# Patient Record
Sex: FEMALE | Race: BLACK OR AFRICAN AMERICAN | NOT HISPANIC OR LATINO
[De-identification: names, ages, dates, MRNs, and addresses within clinical notes are randomized per-mention and may not be internally consistent; named-entity substitution may affect disease eponyms.]

---

## 2019-10-23 PROBLEM — Z00.00 ENCOUNTER FOR PREVENTIVE HEALTH EXAMINATION: Status: ACTIVE | Noted: 2019-10-23

## 2019-10-24 ENCOUNTER — APPOINTMENT (OUTPATIENT)
Dept: OBGYN | Facility: CLINIC | Age: 34
End: 2019-10-24
Payer: COMMERCIAL

## 2019-10-24 VITALS
HEIGHT: 63 IN | TEMPERATURE: 98.7 F | SYSTOLIC BLOOD PRESSURE: 122 MMHG | DIASTOLIC BLOOD PRESSURE: 74 MMHG | RESPIRATION RATE: 16 BRPM | WEIGHT: 167 LBS | BODY MASS INDEX: 29.59 KG/M2

## 2019-10-24 DIAGNOSIS — N91.1 SECONDARY AMENORRHEA: ICD-10-CM

## 2019-10-24 LAB
BILIRUB UR QL STRIP: NORMAL
BILIRUB UR QL STRIP: NORMAL
CLARITY UR: CLEAR
CLARITY UR: CLEAR
COLLECTION METHOD: NORMAL
COLLECTION METHOD: NORMAL
GLUCOSE UR-MCNC: NORMAL
GLUCOSE UR-MCNC: NORMAL
HCG UR QL: 0.2 EU/DL
HCG UR QL: 0.2 EU/DL
HCG UR QL: POSITIVE
HGB UR QL STRIP.AUTO: NORMAL
HGB UR QL STRIP.AUTO: NORMAL
KETONES UR-MCNC: NORMAL
KETONES UR-MCNC: NORMAL
LEUKOCYTE ESTERASE UR QL STRIP: NORMAL
LEUKOCYTE ESTERASE UR QL STRIP: NORMAL
NITRITE UR QL STRIP: NORMAL
NITRITE UR QL STRIP: NORMAL
PH UR STRIP: 6.5
PH UR STRIP: 6.5
PROT UR STRIP-MCNC: NORMAL
PROT UR STRIP-MCNC: NORMAL
QUALITY CONTROL: YES
SP GR UR STRIP: 1.02
SP GR UR STRIP: 1.02

## 2019-10-24 PROCEDURE — 81025 URINE PREGNANCY TEST: CPT

## 2019-10-24 PROCEDURE — 76830 TRANSVAGINAL US NON-OB: CPT

## 2019-10-24 PROCEDURE — 81003 URINALYSIS AUTO W/O SCOPE: CPT | Mod: QW

## 2019-10-24 PROCEDURE — 99204 OFFICE O/P NEW MOD 45 MIN: CPT | Mod: 25

## 2019-10-24 RX ORDER — VITAMIN A ACETATE, .BETA.-CAROTENE, ASCORBIC ACID, CHOLECALCIFEROL, .ALPHA.-TOCOPHEROL ACETATE, DL-, THIAMINE MONONITRATE, RIBOFLAVIN, NIACINAMIDE, PYRIDOXINE HYDROCHLORIDE, FOLIC ACID, CYANOCOBALAMIN, CALCIUM CARBONATE, FERROUS FUMARATE, ZINC OXIDE, AND CUPRIC OXIDE 2000; 2000; 120; 400; 22; 1.84; 3; 20; 10; 1; 12; 200; 27; 25; 2 [IU]/1; [IU]/1; MG/1; [IU]/1; MG/1; MG/1; MG/1; MG/1; MG/1; MG/1; UG/1; MG/1; MG/1; MG/1; MG/1
27-1 TABLET ORAL DAILY
Qty: 90 | Refills: 3 | Status: ACTIVE | COMMUNITY
Start: 2019-10-24 | End: 1900-01-01

## 2019-10-24 NOTE — PROCEDURE
[Intrauterine Pregnancy] : intrauterine pregnancy [Fetal Heart] : fetal heart present [Yolk Sac] : yolk sac present [CRL: ___ (mm)] : CRL - [unfilled]Umm [Date: ___] : EDC: [unfilled] [Current GA by Sonogram: ___ (wks)] : Current GA by Sonogram: [unfilled]Uwks [___ day(s)] : [unfilled] days [WNL] : Transvaginal OB Sonogram WNL [FreeTextEntry1] : SIUP. SONO CWD'S

## 2019-11-14 ENCOUNTER — APPOINTMENT (OUTPATIENT)
Dept: OBGYN | Facility: CLINIC | Age: 34
End: 2019-11-14

## 2019-11-21 ENCOUNTER — NON-APPOINTMENT (OUTPATIENT)
Age: 34
End: 2019-11-21

## 2019-11-21 ENCOUNTER — APPOINTMENT (OUTPATIENT)
Dept: OBGYN | Facility: CLINIC | Age: 34
End: 2019-11-21
Payer: COMMERCIAL

## 2019-11-21 VITALS
SYSTOLIC BLOOD PRESSURE: 110 MMHG | WEIGHT: 173 LBS | RESPIRATION RATE: 18 BRPM | OXYGEN SATURATION: 98 % | HEIGHT: 63 IN | BODY MASS INDEX: 30.65 KG/M2 | DIASTOLIC BLOOD PRESSURE: 80 MMHG

## 2019-11-21 DIAGNOSIS — I49.9 CARDIAC ARRHYTHMIA, UNSPECIFIED: ICD-10-CM

## 2019-11-21 DIAGNOSIS — Z32.00 ENCOUNTER FOR PREGNANCY TEST, RESULT UNKNOWN: ICD-10-CM

## 2019-11-21 PROCEDURE — 76815 OB US LIMITED FETUS(S): CPT

## 2019-11-21 PROCEDURE — 81003 URINALYSIS AUTO W/O SCOPE: CPT | Mod: QW

## 2019-11-21 PROCEDURE — 0501F PRENATAL FLOW SHEET: CPT

## 2019-11-22 LAB
ABO + RH PNL BLD: NORMAL
BASOPHILS # BLD AUTO: 0.02 K/UL
BASOPHILS NFR BLD AUTO: 0.3 %
BILIRUB UR QL STRIP: NORMAL
BLD GP AB SCN SERPL QL: NORMAL
C TRACH RRNA SPEC QL NAA+PROBE: NOT DETECTED
CLARITY UR: CLEAR
COLLECTION METHOD: NORMAL
EOSINOPHIL # BLD AUTO: 0.09 K/UL
EOSINOPHIL NFR BLD AUTO: 1.1 %
ESTIMATED AVERAGE GLUCOSE: 103 MG/DL
GLUCOSE UR-MCNC: NORMAL
HBA1C MFR BLD HPLC: 5.2 %
HBV SURFACE AG SER QL: NONREACTIVE
HCG UR QL: 0.2 EU/DL
HCT VFR BLD CALC: 40.1 %
HGB BLD-MCNC: 13 G/DL
HGB UR QL STRIP.AUTO: NORMAL
HIV1+2 AB SPEC QL IA.RAPID: NONREACTIVE
HPV HIGH+LOW RISK DNA PNL CVX: NOT DETECTED
IMM GRANULOCYTES NFR BLD AUTO: 0.3 %
KETONES UR-MCNC: NORMAL
LEUKOCYTE ESTERASE UR QL STRIP: NORMAL
LYMPHOCYTES # BLD AUTO: 1.96 K/UL
LYMPHOCYTES NFR BLD AUTO: 24.6 %
MAN DIFF?: NORMAL
MCHC RBC-ENTMCNC: 29.3 PG
MCHC RBC-ENTMCNC: 32.4 GM/DL
MCV RBC AUTO: 90.3 FL
MONOCYTES # BLD AUTO: 0.32 K/UL
MONOCYTES NFR BLD AUTO: 4 %
N GONORRHOEA RRNA SPEC QL NAA+PROBE: NOT DETECTED
NEUTROPHILS # BLD AUTO: 5.57 K/UL
NEUTROPHILS NFR BLD AUTO: 69.7 %
NITRITE UR QL STRIP: NORMAL
PH UR STRIP: 7
PLATELET # BLD AUTO: 197 K/UL
PROT UR STRIP-MCNC: NORMAL
RBC # BLD: 4.44 M/UL
RBC # FLD: 13.5 %
RUBV IGG FLD-ACNC: 3.7 INDEX
RUBV IGG SER-IMP: POSITIVE
SOURCE TP AMPLIFICATION: NORMAL
SP GR UR STRIP: 1.02
T PALLIDUM AB SER QL IA: NEGATIVE
TSH SERPL-ACNC: 0.33 UIU/ML
VZV AB TITR SER: POSITIVE
VZV IGG SER IF-ACNC: 1159 INDEX
WBC # FLD AUTO: 7.98 K/UL

## 2019-11-25 LAB
HGB A MFR BLD: 96.4 %
HGB A2 MFR BLD: 3.2 %
HGB F MFR BLD: 0.4 %
HGB FRACT BLD-IMP: NORMAL
RUBV IGM FLD-ACNC: <20 AU/ML

## 2019-11-26 ENCOUNTER — TRANSCRIPTION ENCOUNTER (OUTPATIENT)
Age: 34
End: 2019-11-26

## 2019-12-05 LAB
AR GENE MUT ANL BLD/T: NEGATIVE
B19V IGG SER QL IA: 1.1 INDEX
B19V IGG+IGM SER-IMP: ABNORMAL
B19V IGG+IGM SER-IMP: NORMAL
B19V IGM FLD-ACNC: 0.2 INDEX
B19V IGM SER-ACNC: NEGATIVE
CFTR MUT TESTED BLD/T: NEGATIVE
FMR1 GENE MUT ANL BLD/T: NORMAL

## 2019-12-09 ENCOUNTER — APPOINTMENT (OUTPATIENT)
Dept: OBGYN | Facility: CLINIC | Age: 34
End: 2019-12-09

## 2019-12-09 VITALS
DIASTOLIC BLOOD PRESSURE: 70 MMHG | HEIGHT: 63 IN | SYSTOLIC BLOOD PRESSURE: 128 MMHG | WEIGHT: 173.38 LBS | BODY MASS INDEX: 30.72 KG/M2

## 2019-12-09 DIAGNOSIS — Z3A.14 14 WEEKS GESTATION OF PREGNANCY: ICD-10-CM

## 2019-12-09 LAB
BILIRUB UR QL STRIP: NORMAL
CLARITY UR: CLEAR
COLLECTION METHOD: NORMAL
GLUCOSE UR-MCNC: NORMAL
HCG UR QL: 0.2 EU/DL
HGB UR QL STRIP.AUTO: NORMAL
KETONES UR-MCNC: NORMAL
LEUKOCYTE ESTERASE UR QL STRIP: NORMAL
NITRITE UR QL STRIP: NORMAL
PH UR STRIP: 7
PROT UR STRIP-MCNC: NORMAL
SP GR UR STRIP: 1.02

## 2020-01-06 ENCOUNTER — APPOINTMENT (OUTPATIENT)
Dept: OBGYN | Facility: CLINIC | Age: 35
End: 2020-01-06
Payer: COMMERCIAL

## 2020-01-06 VITALS
RESPIRATION RATE: 16 BRPM | BODY MASS INDEX: 30.86 KG/M2 | WEIGHT: 174.16 LBS | SYSTOLIC BLOOD PRESSURE: 110 MMHG | HEIGHT: 63 IN | DIASTOLIC BLOOD PRESSURE: 70 MMHG | HEART RATE: 72 BPM

## 2020-01-06 DIAGNOSIS — Z3A.18 18 WEEKS GESTATION OF PREGNANCY: ICD-10-CM

## 2020-01-06 DIAGNOSIS — Z34.92 ENCOUNTER FOR SUPERVISION OF NORMAL PREGNANCY, UNSPECIFIED, SECOND TRIMESTER: ICD-10-CM

## 2020-01-06 PROCEDURE — 0502F SUBSEQUENT PRENATAL CARE: CPT

## 2020-01-08 LAB
2ND TRIMESTER DATA: NORMAL
AFP PNL SERPL: NORMAL
AFP SERPL-ACNC: NORMAL
B-HCG FREE SERPL-MCNC: NORMAL
CLINICAL BIOCHEMIST REVIEW: NORMAL
INHIBIN A SERPL-MCNC: NORMAL
NOTES NTD: NORMAL
U ESTRIOL SERPL-SCNC: NORMAL

## 2020-01-16 ENCOUNTER — TRANSCRIPTION ENCOUNTER (OUTPATIENT)
Age: 35
End: 2020-01-16

## 2020-01-16 DIAGNOSIS — Z3A.16 16 WEEKS GESTATION OF PREGNANCY: ICD-10-CM

## 2020-01-16 RX ORDER — OSELTAMIVIR PHOSPHATE 75 MG/1
75 CAPSULE ORAL TWICE DAILY
Qty: 1 | Refills: 0 | Status: ACTIVE | COMMUNITY
Start: 2020-01-16 | End: 1900-01-01

## 2020-01-30 ENCOUNTER — APPOINTMENT (OUTPATIENT)
Dept: ANTEPARTUM | Facility: CLINIC | Age: 35
End: 2020-01-30
Payer: COMMERCIAL

## 2020-01-30 ENCOUNTER — ASOB RESULT (OUTPATIENT)
Age: 35
End: 2020-01-30

## 2020-01-30 PROCEDURE — 76811 OB US DETAILED SNGL FETUS: CPT

## 2020-01-30 PROCEDURE — 76817 TRANSVAGINAL US OBSTETRIC: CPT

## 2020-02-03 ENCOUNTER — APPOINTMENT (OUTPATIENT)
Dept: OBGYN | Facility: CLINIC | Age: 35
End: 2020-02-03
Payer: COMMERCIAL

## 2020-02-03 ENCOUNTER — NON-APPOINTMENT (OUTPATIENT)
Age: 35
End: 2020-02-03

## 2020-02-03 VITALS
WEIGHT: 175 LBS | BODY MASS INDEX: 31.01 KG/M2 | TEMPERATURE: 99.1 F | HEIGHT: 63 IN | DIASTOLIC BLOOD PRESSURE: 72 MMHG | SYSTOLIC BLOOD PRESSURE: 122 MMHG | RESPIRATION RATE: 16 BRPM

## 2020-02-03 PROCEDURE — 81003 URINALYSIS AUTO W/O SCOPE: CPT | Mod: QW

## 2020-02-03 PROCEDURE — 0502F SUBSEQUENT PRENATAL CARE: CPT

## 2020-09-18 ENCOUNTER — TRANSCRIPTION ENCOUNTER (OUTPATIENT)
Age: 35
End: 2020-09-18

## 2021-07-22 ENCOUNTER — TRANSCRIPTION ENCOUNTER (OUTPATIENT)
Age: 36
End: 2021-07-22

## 2023-04-04 ENCOUNTER — APPOINTMENT (OUTPATIENT)
Dept: HUMAN REPRODUCTION | Facility: CLINIC | Age: 38
End: 2023-04-04
Payer: COMMERCIAL

## 2023-04-04 PROCEDURE — 36415 COLL VENOUS BLD VENIPUNCTURE: CPT

## 2023-04-04 PROCEDURE — 99204 OFFICE O/P NEW MOD 45 MIN: CPT

## 2023-04-28 ENCOUNTER — APPOINTMENT (OUTPATIENT)
Dept: HUMAN REPRODUCTION | Facility: CLINIC | Age: 38
End: 2023-04-28
Payer: COMMERCIAL

## 2023-04-28 PROCEDURE — 36415 COLL VENOUS BLD VENIPUNCTURE: CPT

## 2023-04-28 PROCEDURE — 99213 OFFICE O/P EST LOW 20 MIN: CPT | Mod: 25

## 2023-04-28 PROCEDURE — 76830 TRANSVAGINAL US NON-OB: CPT

## 2023-05-02 ENCOUNTER — APPOINTMENT (OUTPATIENT)
Dept: HUMAN REPRODUCTION | Facility: CLINIC | Age: 38
End: 2023-05-02
Payer: COMMERCIAL

## 2023-05-02 PROCEDURE — 76831 ECHO EXAM UTERUS: CPT

## 2023-05-02 PROCEDURE — 58340 CATHETER FOR HYSTEROGRAPHY: CPT

## 2023-05-18 ENCOUNTER — APPOINTMENT (OUTPATIENT)
Dept: HUMAN REPRODUCTION | Facility: CLINIC | Age: 38
End: 2023-05-18
Payer: COMMERCIAL

## 2023-05-18 PROCEDURE — 36415 COLL VENOUS BLD VENIPUNCTURE: CPT

## 2023-05-18 PROCEDURE — 76857 US EXAM PELVIC LIMITED: CPT

## 2023-05-18 PROCEDURE — 99213 OFFICE O/P EST LOW 20 MIN: CPT | Mod: 25

## 2023-05-30 ENCOUNTER — APPOINTMENT (OUTPATIENT)
Dept: HUMAN REPRODUCTION | Facility: CLINIC | Age: 38
End: 2023-05-30
Payer: COMMERCIAL

## 2023-05-30 PROCEDURE — 99213 OFFICE O/P EST LOW 20 MIN: CPT | Mod: 25

## 2023-05-30 PROCEDURE — 36415 COLL VENOUS BLD VENIPUNCTURE: CPT

## 2023-05-30 PROCEDURE — 76830 TRANSVAGINAL US NON-OB: CPT

## 2023-06-07 ENCOUNTER — APPOINTMENT (OUTPATIENT)
Dept: HUMAN REPRODUCTION | Facility: CLINIC | Age: 38
End: 2023-06-07
Payer: SELF-PAY

## 2023-06-07 PROCEDURE — 99213 OFFICE O/P EST LOW 20 MIN: CPT | Mod: 25

## 2023-06-07 PROCEDURE — 76857 US EXAM PELVIC LIMITED: CPT

## 2023-06-27 ENCOUNTER — APPOINTMENT (OUTPATIENT)
Dept: HUMAN REPRODUCTION | Facility: CLINIC | Age: 38
End: 2023-06-27
Payer: SELF-PAY

## 2023-06-27 PROCEDURE — 99213 OFFICE O/P EST LOW 20 MIN: CPT | Mod: 25

## 2023-06-27 PROCEDURE — 36415 COLL VENOUS BLD VENIPUNCTURE: CPT

## 2023-06-27 PROCEDURE — 76830 TRANSVAGINAL US NON-OB: CPT

## 2023-07-06 ENCOUNTER — APPOINTMENT (OUTPATIENT)
Dept: HUMAN REPRODUCTION | Facility: CLINIC | Age: 38
End: 2023-07-06
Payer: SELF-PAY

## 2023-07-06 PROCEDURE — 76857 US EXAM PELVIC LIMITED: CPT

## 2023-07-06 PROCEDURE — 99213 OFFICE O/P EST LOW 20 MIN: CPT | Mod: 25

## 2023-07-26 ENCOUNTER — APPOINTMENT (OUTPATIENT)
Dept: HUMAN REPRODUCTION | Facility: CLINIC | Age: 38
End: 2023-07-26
Payer: SELF-PAY

## 2023-07-26 PROCEDURE — 36415 COLL VENOUS BLD VENIPUNCTURE: CPT

## 2023-07-26 PROCEDURE — 76830 TRANSVAGINAL US NON-OB: CPT

## 2023-07-26 PROCEDURE — 99213 OFFICE O/P EST LOW 20 MIN: CPT | Mod: 25

## 2023-08-24 ENCOUNTER — APPOINTMENT (OUTPATIENT)
Dept: HUMAN REPRODUCTION | Facility: CLINIC | Age: 38
End: 2023-08-24
Payer: SELF-PAY

## 2023-08-24 PROCEDURE — 36415 COLL VENOUS BLD VENIPUNCTURE: CPT

## 2023-08-24 PROCEDURE — 99213 OFFICE O/P EST LOW 20 MIN: CPT | Mod: 25

## 2023-08-24 PROCEDURE — 76830 TRANSVAGINAL US NON-OB: CPT

## 2023-09-05 ENCOUNTER — APPOINTMENT (OUTPATIENT)
Dept: HUMAN REPRODUCTION | Facility: CLINIC | Age: 38
End: 2023-09-05
Payer: SELF-PAY

## 2023-09-05 PROCEDURE — 76857 US EXAM PELVIC LIMITED: CPT

## 2023-09-05 PROCEDURE — 99213 OFFICE O/P EST LOW 20 MIN: CPT | Mod: 25
